# Patient Record
Sex: MALE | Race: WHITE | NOT HISPANIC OR LATINO | Employment: OTHER | URBAN - METROPOLITAN AREA
[De-identification: names, ages, dates, MRNs, and addresses within clinical notes are randomized per-mention and may not be internally consistent; named-entity substitution may affect disease eponyms.]

---

## 2024-01-01 ENCOUNTER — NURSING HOME VISIT (OUTPATIENT)
Dept: GERIATRICS | Facility: CLINIC | Age: 79
End: 2024-01-01
Payer: MEDICARE

## 2024-01-01 ENCOUNTER — DOCUMENTATION ONLY (OUTPATIENT)
Dept: GERIATRICS | Facility: CLINIC | Age: 79
End: 2024-01-01

## 2024-01-01 VITALS
SYSTOLIC BLOOD PRESSURE: 151 MMHG | HEART RATE: 74 BPM | WEIGHT: 213 LBS | TEMPERATURE: 97.3 F | RESPIRATION RATE: 18 BRPM | OXYGEN SATURATION: 98 % | DIASTOLIC BLOOD PRESSURE: 92 MMHG | BODY MASS INDEX: 28.23 KG/M2 | HEIGHT: 73 IN

## 2024-01-01 DIAGNOSIS — Z91.81 PERSONAL HISTORY OF FALL: ICD-10-CM

## 2024-01-01 DIAGNOSIS — Z86.73 HISTORY OF STROKE: ICD-10-CM

## 2024-01-01 DIAGNOSIS — E43 SEVERE MALNUTRITION (H): ICD-10-CM

## 2024-01-01 DIAGNOSIS — I50.23 ACUTE ON CHRONIC SYSTOLIC HEART FAILURE (H): ICD-10-CM

## 2024-01-01 DIAGNOSIS — N04.9 NEPHROSIS: ICD-10-CM

## 2024-01-01 DIAGNOSIS — Z51.5 HOSPICE CARE PATIENT: Primary | ICD-10-CM

## 2024-01-01 DIAGNOSIS — F03.C18 SEVERE DEMENTIA WITH OTHER BEHAVIORAL DISTURBANCE, UNSPECIFIED DEMENTIA TYPE (H): ICD-10-CM

## 2024-01-01 PROCEDURE — 99316 NF DSCHRG MGMT 30 MIN+: CPT | Mod: GV | Performed by: NURSE PRACTITIONER

## 2024-01-01 RX ORDER — POLYETHYLENE GLYCOL 3350 17 G/17G
1 POWDER, FOR SOLUTION ORAL EVERY OTHER DAY
COMMUNITY

## 2024-01-01 RX ORDER — LANOLIN ALCOHOL/MO/W.PET/CERES
3 CREAM (GRAM) TOPICAL
COMMUNITY

## 2024-01-01 RX ORDER — SENNOSIDES 8.6 MG
1 TABLET ORAL 2 TIMES DAILY PRN
COMMUNITY

## 2024-01-01 RX ORDER — BACLOFEN 10 MG/1
10 TABLET ORAL 3 TIMES DAILY
COMMUNITY

## 2024-01-01 RX ORDER — OLANZAPINE 5 MG/1
5 TABLET ORAL EVERY 4 HOURS PRN
COMMUNITY

## 2024-01-01 RX ORDER — TAMSULOSIN HYDROCHLORIDE 0.4 MG/1
0.4 CAPSULE ORAL DAILY
COMMUNITY

## 2024-01-01 RX ORDER — MORPHINE SULFATE 30 MG/1
5 TABLET ORAL
COMMUNITY

## 2024-01-01 RX ORDER — BISACODYL 10 MG
10 SUPPOSITORY, RECTAL RECTAL DAILY PRN
COMMUNITY

## 2024-01-01 RX ORDER — HYOSCYAMINE SULFATE 0.125 MG
0.12 TABLET ORAL
COMMUNITY

## 2024-01-01 RX ORDER — LORAZEPAM 0.5 MG/1
0.5 TABLET ORAL
COMMUNITY

## 2024-01-16 NOTE — LETTER
1/16/2024        RE: Yonathan Farrar  [address]  [facility] [address]        [facility] GERIATRICS    PRIMARY CARE PROVIDER AND CLINIC:  DEVEN Noonan CNP, [address] [facility] / [place] [address]  Chief Complaint   Patient presents with     [facility] Medical Record Number:  [id number]  Place of Service where encounter took place:  [facility] () [[id number]]    Yonathan Farrar  is a 78 year old  (1945), admitted to the above facility from  [facility] . Hospital stay 12/19/23 through 1/11/24..   HPI:    78 y.o. male who presented to the ER 12/29/23 with generalized weakness.  Patient is a difficult historian and not able to reach his family to get further history so somewhat limited. Patient has been feeling increasingly generally weak over the last week. No specific symptoms. Denies fever, chills, cough, abdominal pain, nausea, vomiting, diarrhea. He has felt so weak that he has fallen a few times. He remembers all of the falls and denies any injury. His sister has had to call EMS a few times to help him get back to his chair. He denies urinary symptoms but notes a few episodes of incontinence while he was in his chair, which is unusual for him. His mood has been down since his wife's death several months ago. Had a brief hospitalization for suicidal ideation in August. No suicidal thoughts or thoughts of self-harm since. He feels more motivated recently and is trying to get his father inducted into the [place] [employer] [facility]. He lives with his sister and feels like he has good family support. Found to have significant bilateral hydronephrosis, turner placed for decompression. The patient verbalized wanting to pursue comfort cares and hospice and no longer wanting aggressive treatment. He was admitted into LTC with hospice, however now plans to discharge home this week with family and hospice support.     The primary encounter diagnosis was Hospice care  patient. Diagnoses of Nephrosis, Personal history of fall, Acute on chronic systolic heart failure (H), History of stroke, Severe dementia with other behavioral disturbance, unspecified dementia type (H), and Severe malnutrition (H24) were also pertinent to this visit.    Met with patient who was found lying in bed. Mood pleasant and cooperative. Able to make needs known. He denies any chest pain, palpitations, shortness of breath, TOSCANO, lightheadedness, dizziness, or cough. Denies any abdominal discomfort. Denies N&V. Denies dysuria or frequency. Hernandez catheter present with clear yellow urine present. Denies loose or constipation. Oral intake remains poor. Sleeping well. Denies any pain complaints. He reports being happy that he is discharging home soon with sister and hospice support.     BP Readings from Last 3 Encounters:   01/16/24 (!) 151/92     Wt Readings from Last 5 Encounters:   01/16/24 96.6 kg (213 lb)     CODE STATUS/ADVANCE DIRECTIVES DISCUSSION:  No CPR- Do NOT Intubate  DNR / DNI  ALLERGIES: No Known Allergies   PAST MEDICAL HISTORY:   Past Medical History:   Diagnosis Date     Acute heart failure, unspecified heart failure type (H)      Acute renal failure, unspecified acute renal failure type (H24)      Acute systolic congestive heart failure (H)      Adjustment disorder with depressed mood      Chronic kidney disease, stage 3 unspecified (H)      Cystitis, unspecified without hematuria      Encounter for palliative care      Essential (primary) hypertension      Generalized muscle weakness      Gross hematuria      Hemiplegia and hemiparesis following unspecified cerebrovascular disease affecting unspecified side (H)      Hyperlipidemia, unspecified      Hyperparathyroidism, unspecified (H24)      NSTEMI (non-ST elevated myocardial infarction) (H)      Obstructive sleep apnea (adult) (pediatric)      Other fatigue      Other forms of acute ischemic heart disease      Other malaise      Other  retention of urine      Pain      Personal history of fall      Shortness of breath      Unspecified atrial fibrillation (H)      Unspecified dementia, unspecified severity, without behavioral disturbance, psychotic disturbance, mood disturbance, and anxiety (H)       PAST SURGICAL HISTORY:   has a past surgical history that includes IR Iliac Artery Angioplasty/ Stent (9/25/2015).  FAMILY HISTORY: family history is not on file.  SOCIAL HISTORY:     Patient's living condition:  Sister will move in to assist with cares. Continue hospice at home is goal    Post Discharge Medication Reconciliation Status:   MED REC REQUIRED  Post Medication Reconciliation Status:  Discharge medications reconciled and changed, see notes/orders       Current Outpatient Medications   Medication Sig     baclofen (LIORESAL) 10 MG tablet Take 10 mg by mouth 3 times daily     bisacodyl (DULCOLAX) 10 MG suppository Place 10 mg rectally daily as needed for constipation     hyoscyamine (LEVSIN) 0.125 MG tablet Take 0.125 mg by mouth every 2 hours as needed for cramping     LORazepam (ATIVAN) 0.5 MG tablet Take 0.5 mg by mouth every 2 hours as needed for anxiety     melatonin 3 MG tablet Take 3 mg by mouth nightly as needed for sleep     morphine 5 MG solu-tab Take 5 mg by mouth every hour as needed for shortness of breath or moderate to severe pain     OLANZapine (ZYPREXA) 5 MG tablet Take 5 mg by mouth every 4 hours as needed (delirium)     polyethylene glycol (MIRALAX) 17 g packet Take 1 packet by mouth every other day     sennosides (SENOKOT) 8.6 MG tablet Take 1 tablet by mouth 2 times daily as needed for constipation     tamsulosin (FLOMAX) 0.4 MG capsule Take 0.4 mg by mouth daily     No current facility-administered medications for this visit.       ROS:  4 point ROS including Respiratory, CV, GI and , other than that noted in the HPI,  is negative      Vitals:  BP (!) 151/92   Pulse 74   Temp 97.3  F (36.3  C)   Resp 18   Ht 1.854 m  "(6' 1\")   Wt 96.6 kg (213 lb)   SpO2 98%   BMI 28.10 kg/m    Exam:  GENERAL APPEARANCE:  Alert, in no distress, cooperative  ENT:  Mouth and posterior oropharynx normal, moist mucous membranes, [place]  EYES:  EOM, conjunctivae, lids, pupils and irises normal  NECK:  No adenopathy,masses or thyromegaly  RESP:  respiratory effort and palpation of chest normal, lungs clear to auscultation , no respiratory distress  CV:  Palpation and auscultation of heart done , regular rate and rhythm, no murmur, rub, or gallop, no edema, +2 pedal pulses  ABDOMEN:  normal bowel sounds, soft, nontender, no hepatosplenomegaly or other masses, no guarding or rebound  M/S:   Wheelchair bound. Staff to assist for all ADLs, transfers and cares  SKIN:  Inspection of skin and subcutaneous tissue baseline, Palpation of skin and subcutaneous tissue baseline  NEURO:   Cranial nerves 2-12 are normal tested and grossly at patient's baseline, no purposeful movement in upper and lower extremities  PSYCH:  oriented to self and place, insight and judgement impaired, memory impaired , affect and mood normal    Lab/Diagnostic data:  Patient is on hospice/palliative care and labs are not recommended    ASSESSMENT/PLAN:       Hospice care patient  Nephrosis  Personal history of fall  Acute on chronic systolic heart failure (H)  History of stroke  Severe dementia with other behavioral disturbance, unspecified dementia type (H)  Severe malnutrition (H24)  Comment: Acute on chronic. Patient opted for hospice treatment and does not want aggressive intervention. He was admitted into LTC, however sister plans to move in with him to provide care along with hospice support at home.   Plan:  -Continue hospice with [facility] as directed  -Reduce polypharmacy where able  -Monitor for worsening s/symptoms of concern  -Hernandez catheter as directed. Hospice to manage post discharge.     Electronically signed by:  Dr. Regla Donato DNP, APRN, FNP-C, WCS-C, EDS-C        M " [facility] GERIATRICS DISCHARGE SUMMARY  PATIENT'S NAME: Yonathan Farrar  YOB: 1945  MEDICAL RECORD NUMBER:  [id number]  Place of Service where encounter took place:  [facility] () [[id number]]    PRIMARY CARE PROVIDER AND CLINIC RESPONSIBLE AFTER TRANSFER:   Keke Diaz III, MD   [address] [place] [address]    Non-FMG Provider     Transferring providers: Regla Donato, DEVEN CNP, Meka Lovell MD  Recent Hospitalization/ED:  [facility]  stay 12/29/23 to 1/11/24.  Date of SNF Admission:  1/11/24  Date of SNF (anticipated) Discharge:  anticipated before 1/19/24 with hospice support  Discharged to: previous independent home with sister and hospice support  Cognitive Scores:  unable to assessed    Physical Function:  bed bound. Assistance for all ADLs, transfers and cares  DME: hospice to assist with DME needs for home care    CODE STATUS/ADVANCE DIRECTIVES DISCUSSION:  No CPR- Do NOT Intubate     NURSING FACILITY COURSE   Medication Changes/Rationale:   See above    Summary of nursing facility stay:   See above    Controlled medications:   Hospice to continue to order and send at home as directed       DISCHARGE PLAN:  Follow up labs: No labs orders/due  Medical Follow Up:      Follow up with ongoing hospice goals and needs  Current [place] scheduled appointments:  Discharge Services: Home Care:  Hospice with [facility] as directed  Discharge Instructions Verbalized to Patient at Discharge:   Weight bearing restrictions:  Weight bearing as tolerated.   Continue to follow your diet:  regular.   Continue turner catheter needs. Change PRN  24-hour supervision is recommended for safety.     TOTAL DISCHARGE TIME:   Greater than 30 minutes  Electronically signed by:  Dr. Regla Donato DNP, APRN, FNP-C, WCS-C, EDS-C    Documentation of Face to Face and Certification for Home Health Services    I certify that patient: Yonathan Farrar is under my care and that I, or a nurse  practitioner or physician's assistant working with me, had a face-to-face encounter that meets the physician face-to-face encounter requirements with this patient on: 1/16/2024.    This encounter with the patient was in whole, or in part, for the following medical condition, which is the primary reason for home health care: The primary encounter diagnosis was Hospice care patient. Diagnoses of Nephrosis, Personal history of fall, Acute on chronic systolic heart failure (H), History of stroke, Severe dementia with other behavioral disturbance, unspecified dementia type (H), and Severe malnutrition (H24) were also pertinent to this visit..    I certify that, based on my findings, the following services are medically necessary home health services:  Hospice with [facility] .    My clinical findings support the need for the above services because:  Hospice for ongoing decline    Further, I certify that my clinical findings support that this patient is homebound (i.e. absences from home require considerable and taxing effort and are for medical reasons or Christian services or infrequently or of short duration when for other reasons) because:  Hospice for all ongoing needs and support .    Based on the above findings. I certify that this patient is confined to the home and needs intermittent skilled nursing care, physical therapy and/or speech therapy.  The patient is under my care, and I have initiated the establishment of the plan of care.  This patient will be followed by a physician who will periodically review the plan of care.  Physician/Provider to provide follow up care: Keke Diaz III, MD     Attending hospital physician (the Medicare certified [place] provider): Dr.Sara Nas MD, signing F2F and only signing for initial order. Please send all follow up questions and concerns or needed follow up signatures to the PCP, who [place] has on file as: Keke Diaz III, MD     Physician Signature: See  electronic signature associated with these discharge orders.  Date: 1/15/2024                    Sincerely,        DEVEN Noonan CNP

## 2024-01-16 NOTE — LETTER
1/16/2024        RE: Yonathan Farrar  [address]  [facility] [address]        [facility] GERIATRICS    PRIMARY CARE PROVIDER AND CLINIC:  DEVEN Noonan CNP, [address] [facility] / [place] [address]  Chief Complaint   Patient presents with     [facility] Medical Record Number:  [id number]  Place of Service where encounter took place:  [facility] () [[id number]]    Yonathan Farrar  is a 78 year old  (1945), admitted to the above facility from  [facility] . Hospital stay 12/19/23 through 1/11/24..   HPI:    78 y.o. male who presented to the ER 12/29/23 with generalized weakness.  Patient is a difficult historian and not able to reach his family to get further history so somewhat limited. Patient has been feeling increasingly generally weak over the last week. No specific symptoms. Denies fever, chills, cough, abdominal pain, nausea, vomiting, diarrhea. He has felt so weak that he has fallen a few times. He remembers all of the falls and denies any injury. His sister has had to call EMS a few times to help him get back to his chair. He denies urinary symptoms but notes a few episodes of incontinence while he was in his chair, which is unusual for him. His mood has been down since his wife's death several months ago. Had a brief hospitalization for suicidal ideation in August. No suicidal thoughts or thoughts of self-harm since. He feels more motivated recently and is trying to get his father inducted into the [place] [employer] of [facility]. He lives with his sister and feels like he has good family support. Found to have significant bilateral hydronephrosis, turner placed for decompression. The patient verbalized wanting to pursue comfort cares and hospice and no longer wanting aggressive treatment. He was admitted into LTC with hospice, however now plans to discharge home this week with family and hospice support.     The primary encounter diagnosis was Hospice care  patient. Diagnoses of Nephrosis, Personal history of fall, Acute on chronic systolic heart failure (H), History of stroke, Severe dementia with other behavioral disturbance, unspecified dementia type (H), and Severe malnutrition (H24) were also pertinent to this visit.    Met with patient who was found lying in bed. Mood pleasant and cooperative. Able to make needs known. He denies any chest pain, palpitations, shortness of breath, TOSCANO, lightheadedness, dizziness, or cough. Denies any abdominal discomfort. Denies N&V. Denies dysuria or frequency. Hernandez catheter present with clear yellow urine present. Denies loose or constipation. Oral intake remains poor. Sleeping well. Denies any pain complaints. He reports being happy that he is discharging home soon with sister and hospice support.     BP Readings from Last 3 Encounters:   01/16/24 (!) 151/92     Wt Readings from Last 5 Encounters:   01/16/24 96.6 kg (213 lb)     CODE STATUS/ADVANCE DIRECTIVES DISCUSSION:  No CPR- Do NOT Intubate  DNR / DNI  ALLERGIES: No Known Allergies   PAST MEDICAL HISTORY:   Past Medical History:   Diagnosis Date     Acute heart failure, unspecified heart failure type (H)      Acute renal failure, unspecified acute renal failure type (H24)      Acute systolic congestive heart failure (H)      Adjustment disorder with depressed mood      Chronic kidney disease, stage 3 unspecified (H)      Cystitis, unspecified without hematuria      Encounter for palliative care      Essential (primary) hypertension      Generalized muscle weakness      Gross hematuria      Hemiplegia and hemiparesis following unspecified cerebrovascular disease affecting unspecified side (H)      Hyperlipidemia, unspecified      Hyperparathyroidism, unspecified (H24)      NSTEMI (non-ST elevated myocardial infarction) (H)      Obstructive sleep apnea (adult) (pediatric)      Other fatigue      Other forms of acute ischemic heart disease      Other malaise      Other  retention of urine      Pain      Personal history of fall      Shortness of breath      Unspecified atrial fibrillation (H)      Unspecified dementia, unspecified severity, without behavioral disturbance, psychotic disturbance, mood disturbance, and anxiety (H)       PAST SURGICAL HISTORY:   has a past surgical history that includes IR Iliac Artery Angioplasty/ Stent (9/25/2015).  FAMILY HISTORY: family history is not on file.  SOCIAL HISTORY:     Patient's living condition:  Sister will move in to assist with cares. Continue hospice at home is goal    Post Discharge Medication Reconciliation Status:   MED REC REQUIRED  Post Medication Reconciliation Status:  Discharge medications reconciled and changed, see notes/orders       Current Outpatient Medications   Medication Sig     baclofen (LIORESAL) 10 MG tablet Take 10 mg by mouth 3 times daily     bisacodyl (DULCOLAX) 10 MG suppository Place 10 mg rectally daily as needed for constipation     hyoscyamine (LEVSIN) 0.125 MG tablet Take 0.125 mg by mouth every 2 hours as needed for cramping     LORazepam (ATIVAN) 0.5 MG tablet Take 0.5 mg by mouth every 2 hours as needed for anxiety     melatonin 3 MG tablet Take 3 mg by mouth nightly as needed for sleep     morphine 5 MG solu-tab Take 5 mg by mouth every hour as needed for shortness of breath or moderate to severe pain     OLANZapine (ZYPREXA) 5 MG tablet Take 5 mg by mouth every 4 hours as needed (delirium)     polyethylene glycol (MIRALAX) 17 g packet Take 1 packet by mouth every other day     sennosides (SENOKOT) 8.6 MG tablet Take 1 tablet by mouth 2 times daily as needed for constipation     tamsulosin (FLOMAX) 0.4 MG capsule Take 0.4 mg by mouth daily     No current facility-administered medications for this visit.       ROS:  4 point ROS including Respiratory, CV, GI and , other than that noted in the HPI,  is negative      Vitals:  BP (!) 151/92   Pulse 74   Temp 97.3  F (36.3  C)   Resp 18   Ht 1.854 m  "(6' 1\")   Wt 96.6 kg (213 lb)   SpO2 98%   BMI 28.10 kg/m    Exam:  GENERAL APPEARANCE:  Alert, in no distress, cooperative  ENT:  Mouth and posterior oropharynx normal, moist mucous membranes, [place]  EYES:  EOM, conjunctivae, lids, pupils and irises normal  NECK:  No adenopathy,masses or thyromegaly  RESP:  respiratory effort and palpation of chest normal, lungs clear to auscultation , no respiratory distress  CV:  Palpation and auscultation of heart done , regular rate and rhythm, no murmur, rub, or gallop, no edema, +2 pedal pulses  ABDOMEN:  normal bowel sounds, soft, nontender, no hepatosplenomegaly or other masses, no guarding or rebound  M/S:   Wheelchair bound. Staff to assist for all ADLs, transfers and cares  SKIN:  Inspection of skin and subcutaneous tissue baseline, Palpation of skin and subcutaneous tissue baseline  NEURO:   Cranial nerves 2-12 are normal tested and grossly at patient's baseline, no purposeful movement in upper and lower extremities  PSYCH:  oriented to self and place, insight and judgement impaired, memory impaired , affect and mood normal    Lab/Diagnostic data:  Patient is on hospice/palliative care and labs are not recommended    ASSESSMENT/PLAN:       Hospice care patient  Nephrosis  Personal history of fall  Acute on chronic systolic heart failure (H)  History of stroke  Severe dementia with other behavioral disturbance, unspecified dementia type (H)  Severe malnutrition (H24)  Comment: Acute on chronic. Patient opted for hospice treatment and does not want aggressive intervention. He was admitted into LTC, however sister plans to move in with him to provide care along with hospice support at home.   Plan:  -Continue hosp[facility] with [facility] as directed  -Reduce polypharmacy where able  -Monitor for worsening s/symptoms of concern  -Hernandez catheter as directed. Hospice to manage post discharge.     Electronically signed by:  Dr. Regla Donato DNP, APRN, FNP-C, WCS-C, EDS-C        M " [facility] GERIATRICS DISCHARGE SUMMARY  PATIENT'S NAME: Yonathan Farrar  YOB: 1945  MEDICAL RECORD NUMBER:  [id number]  Place of Service where encounter took place:  [facility] () [[id number]]    PRIMARY CARE PROVIDER AND CLINIC RESPONSIBLE AFTER TRANSFER:   Keke Diaz III, MD   [address] [place] [address]    Non-FMG Provider     Transferring providers: Regla Donato, DEVEN CNP, Meka Lovell MD  Recent Hospitalization/ED:  [facility]  stay 12/29/23 to 1/11/24.  Date of SNF Admission:  1/11/24  Date of SNF (anticipated) Discharge:  anticipated before 1/19/24 with hospice support  Discharged to: previous independent home with sister and hospice support  Cognitive Scores:  unable to assessed    Physical Function:  bed bound. Assistance for all ADLs, transfers and cares  DME: hospice to assist with DME needs for home care    CODE STATUS/ADVANCE DIRECTIVES DISCUSSION:  No CPR- Do NOT Intubate     NURSING FACILITY COURSE   Medication Changes/Rationale:   See above    Summary of nursing facility stay:   See above    Controlled medications:   Hospice to continue to order and send at home as directed       DISCHARGE PLAN:  Follow up labs: No labs orders/due  Medical Follow Up:      Follow up with ongoing hospice goals and needs  Current [place] scheduled appointments:  Discharge Services: Home Care:  Hospice with [facility] as directed  Discharge Instructions Verbalized to Patient at Discharge:   Weight bearing restrictions:  Weight bearing as tolerated.   Continue to follow your diet:  regular.   Continue turner catheter needs. Change PRN  24-hour supervision is recommended for safety.     TOTAL DISCHARGE TIME:   Greater than 30 minutes  Electronically signed by:  Dr. Regla Donato DNP, APRN, FNP-C, WCS-C, EDS-C    Documentation of Face to Face and Certification for Home Health Services    I certify that patient: Yonathan Farrar is under my care and that I, or a nurse  practitioner or physician's assistant working with me, had a face-to-face encounter that meets the physician face-to-face encounter requirements with this patient on: 1/16/2024.    This encounter with the patient was in whole, or in part, for the following medical condition, which is the primary reason for home health care: The primary encounter diagnosis was Hospice care patient. Diagnoses of Nephrosis, Personal history of fall, Acute on chronic systolic heart failure (H), History of stroke, Severe dementia with other behavioral disturbance, unspecified dementia type (H), and Severe malnutrition (H24) were also pertinent to this visit..    I certify that, based on my findings, the following services are medically necessary home health services:  Hospice with [facility] .    My clinical findings support the need for the above services because:  Hospice for ongoing decline    Further, I certify that my clinical findings support that this patient is homebound (i.e. absences from home require considerable and taxing effort and are for medical reasons or [employer]hov[employer]'s witness services or infrequently or of short duration when for other reasons) because:  Hospice for all ongoing needs and support .    Based on the above findings. I certify that this patient is confined to the home and needs intermittent skilled nursing care, physical therapy and/or speech therapy.  The patient is under my care, and I have initiated the establishment of the plan of care.  This patient will be followed by a physician who will periodically review the plan of care.  Physician/Provider to provide follow up care: Keke Diaz III, MD     Attending hospital physician (the Medicare certified [place] provider): Dr.Sara Nas MD, signing F2F and only signing for initial order. Please send all follow up questions and concerns or needed follow up signatures to the PCP, who [place] has on file as: Kkee Diaz III, MD     Physician Signature: See  electronic signature associated with these discharge orders.  Date: 1/15/2024                    Sincerely,        DEVEN Noonan CNP

## 2024-01-16 NOTE — LETTER
1/16/2024        RE: Yonathan Farrar  [address]  [facility] [address]        [facility] GERIATRICS    PRIMARY CARE PROVIDER AND CLINIC:  DEVEN Noonan CNP, [address] [facility] / [place] [address]  Chief Complaint   Patient presents with     [facility] Medical Record Number:  [id number]  Place of Service where encounter took place:  [facility] () [[id number]]    Yonathan Farrar  is a 78 year old  (1945), admitted to the above facility from  [facility] . Hospital stay 12/19/23 through 1/11/24..   HPI:    78 y.o. male who presented to the ER 12/29/23 with generalized weakness.  Patient is a difficult historian and not able to reach his family to get further history so somewhat limited. Patient has been feeling increasingly generally weak over the last week. No specific symptoms. Denies fever, chills, cough, abdominal pain, nausea, vomiting, diarrhea. He has felt so weak that he has fallen a few times. He remembers all of the falls and denies any injury. His sister has had to call EMS a few times to help him get back to his chair. He denies urinary symptoms but notes a few episodes of incontinence while he was in his chair, which is unusual for him. His mood has been down since his wife's death several months ago. Had a brief hospitalization for suicidal ideation in August. No suicidal thoughts or thoughts of self-harm since. He feels more motivated recently and is trying to get his father inducted into the [place] [employer] [facility]. He lives with his sister and feels like he has good family support. Found to have significant bilateral hydronephrosis, turner placed for decompression. The patient verbalized wanting to pursue comfort cares and hospice and no longer wanting aggressive treatment. He was admitted into LTC with hospice, however now plans to discharge home this week with family and hospice support.     The primary encounter diagnosis was Hospice care  patient. Diagnoses of Nephrosis, Personal history of fall, Acute on chronic systolic heart failure (H), History of stroke, Severe dementia with other behavioral disturbance, unspecified dementia type (H), and Severe malnutrition (H24) were also pertinent to this visit.    Met with patient who was found lying in bed. Mood pleasant and cooperative. Able to make needs known. He denies any chest pain, palpitations, shortness of breath, TOSCANO, lightheadedness, dizziness, or cough. Denies any abdominal discomfort. Denies N&V. Denies dysuria or frequency. Hernandez catheter present with clear yellow urine present. Denies loose or constipation. Oral intake remains poor. Sleeping well. Denies any pain complaints. He reports being happy that he is discharging home soon with sister and hospice support.     BP Readings from Last 3 Encounters:   01/16/24 (!) 151/92     Wt Readings from Last 5 Encounters:   01/16/24 96.6 kg (213 lb)     CODE STATUS/ADVANCE DIRECTIVES DISCUSSION:  No CPR- Do NOT Intubate  DNR / DNI  ALLERGIES: No Known Allergies   PAST MEDICAL HISTORY:   Past Medical History:   Diagnosis Date     Acute heart failure, unspecified heart failure type (H)      Acute renal failure, unspecified acute renal failure type (H24)      Acute systolic congestive heart failure (H)      Adjustment disorder with depressed mood      Chronic kidney disease, stage 3 unspecified (H)      Cystitis, unspecified without hematuria      Encounter for palliative care      Essential (primary) hypertension      Generalized muscle weakness      Gross hematuria      Hemiplegia and hemiparesis following unspecified cerebrovascular disease affecting unspecified side (H)      Hyperlipidemia, unspecified      Hyperparathyroidism, unspecified (H24)      NSTEMI (non-ST elevated myocardial infarction) (H)      Obstructive sleep apnea (adult) (pediatric)      Other fatigue      Other forms of acute ischemic heart disease      Other malaise      Other  retention of urine      Pain      Personal history of fall      Shortness of breath      Unspecified atrial fibrillation (H)      Unspecified dementia, unspecified severity, without behavioral disturbance, psychotic disturbance, mood disturbance, and anxiety (H)       PAST SURGICAL HISTORY:   has a past surgical history that includes IR Iliac Artery Angioplasty/ Stent (9/25/2015).  FAMILY HISTORY: family history is not on file.  SOCIAL HISTORY:     Patient's living condition:  Sister will move in to assist with cares. Continue hospice at home is goal    Post Discharge Medication Reconciliation Status:   MED REC REQUIRED  Post Medication Reconciliation Status:  Discharge medications reconciled and changed, see notes/orders       Current Outpatient Medications   Medication Sig     baclofen (LIORESAL) 10 MG tablet Take 10 mg by mouth 3 times daily     bisacodyl (DULCOLAX) 10 MG suppository Place 10 mg rectally daily as needed for constipation     hyoscyamine (LEVSIN) 0.125 MG tablet Take 0.125 mg by mouth every 2 hours as needed for cramping     LORazepam (ATIVAN) 0.5 MG tablet Take 0.5 mg by mouth every 2 hours as needed for anxiety     melatonin 3 MG tablet Take 3 mg by mouth nightly as needed for sleep     morphine 5 MG solu-tab Take 5 mg by mouth every hour as needed for shortness of breath or moderate to severe pain     OLANZapine (ZYPREXA) 5 MG tablet Take 5 mg by mouth every 4 hours as needed (delirium)     polyethylene glycol (MIRALAX) 17 g packet Take 1 packet by mouth every other day     sennosides (SENOKOT) 8.6 MG tablet Take 1 tablet by mouth 2 times daily as needed for constipation     tamsulosin (FLOMAX) 0.4 MG capsule Take 0.4 mg by mouth daily     No current facility-administered medications for this visit.       ROS:  4 point ROS including Respiratory, CV, GI and , other than that noted in the HPI,  is negative      Vitals:  BP (!) 151/92   Pulse 74   Temp 97.3  F (36.3  C)   Resp 18   Ht 1.854 m  "(6' 1\")   Wt 96.6 kg (213 lb)   SpO2 98%   BMI 28.10 kg/m    Exam:  GENERAL APPEARANCE:  Alert, in no distress, cooperative  ENT:  Mouth and posterior oropharynx normal, moist mucous membranes, [place]  EYES:  EOM, conjunctivae, lids, pupils and irises normal  NECK:  No adenopathy,masses or thyromegaly  RESP:  respiratory effort and palpation of chest normal, lungs clear to auscultation , no respiratory distress  CV:  Palpation and auscultation of heart done , regular rate and rhythm, no murmur, rub, or gallop, no edema, +2 pedal pulses  ABDOMEN:  normal bowel sounds, soft, nontender, no hepatosplenomegaly or other masses, no guarding or rebound  M/S:   Wheelchair bound. Staff to assist for all ADLs, transfers and cares  SKIN:  Inspection of skin and subcutaneous tissue baseline, Palpation of skin and subcutaneous tissue baseline  NEURO:   Cranial nerves 2-12 are normal tested and grossly at patient's baseline, no purposeful movement in upper and lower extremities  PSYCH:  oriented to self and place, insight and judgement impaired, memory impaired , affect and mood normal    Lab/Diagnostic data:  Patient is on hospice/palliative care and labs are not recommended    ASSESSMENT/PLAN:       Hospice care patient  Nephrosis  Personal history of fall  Acute on chronic systolic heart failure (H)  History of stroke  Severe dementia with other behavioral disturbance, unspecified dementia type (H)  Severe malnutrition (H24)  Comment: Acute on chronic. Patient opted for hospice treatment and does not want aggressive intervention. He was admitted into LTC, however sister plans to move in with him to provide care along with hospice support at home.   Plan:  -Continue hospice with [facility] as directed  -Reduce polypharmacy where able  -Monitor for worsening s/symptoms of concern  -Hernandez catheter as directed. Hospice to manage post discharge.     Electronically signed by:  Dr. eRgla Donato DNP, APRN, FNP-C, WCS-C, EDS-C        [facility] GERIATRICS DISCHARGE SUMMARY  PATIENT'S NAME: Yonathan Farrar  YOB: 1945  MEDICAL RECORD NUMBER:  [id number]  Place of Service where encounter took place:  [facility] () [[id number]]    PRIMARY CARE PROVIDER AND CLINIC RESPONSIBLE AFTER TRANSFER:   Keke Diaz III, MD   [address] [place] [address]    Non-[facility] Provider     Transferring providers: Regla Donato, DEVEN CNP, Meka Lovell MD  Recent Hospitalization/ED:  [facility]  stay 12/29/23 to 1/11/24.  Date of SNF Admission:  1/11/24  Date of SNF (anticipated) Discharge:  anticipated before 1/19/24 with hospice support  Discharged to: previous independent home with sister and hospice support  Cognitive Scores:  unable to assessed    Physical Function:  bed bound. Assistance for all ADLs, transfers and cares  DME: hospice to assist with DME needs for home care    CODE STATUS/ADVANCE DIRECTIVES DISCUSSION:  No CPR- Do NOT Intubate     NURSING FACILITY COURSE   Medication Changes/Rationale:   See above    Summary of nursing facility stay:   See above    Controlled medications:   Hospice to continue to order and send at home as directed       DISCHARGE PLAN:  Follow up labs: No labs orders/due  Medical Follow Up:      Follow up with ongoing hospice goals and needs  Current [place] scheduled appointments:  Discharge Services: Home Care:  Hospice with [facility] as directed  Discharge Instructions Verbalized to Patient at Discharge:   Weight bearing restrictions:  Weight bearing as tolerated.   Continue to follow your diet:  regular.   Continue turner catheter needs. Change PRN  24-hour supervision is recommended for safety.     TOTAL DISCHARGE TIME:   Greater than 30 minutes  Electronically signed by:  Dr. Regla Donato DNP, APRN, FNP-C, WCS-C, EDS-C    Documentation of Face to Face and Certification for Home Health Services    I certify that patient: Yonathan Farrar is under my care and that I, or a nurse  practitioner or physician's assistant working with me, had a face-to-face encounter that meets the physician face-to-face encounter requirements with this patient on: 1/16/2024.    This encounter with the patient was in whole, or in part, for the following medical condition, which is the primary reason for home health care: The primary encounter diagnosis was Hospice care patient. Diagnoses of Nephrosis, Personal history of fall, Acute on chronic systolic heart failure (H), History of stroke, Severe dementia with other behavioral disturbance, unspecified dementia type (H), and Severe malnutrition (H24) were also pertinent to this visit..    I certify that, based on my findings, the following services are medically necessary home health services:  Hospice with [facility] .    My clinical findings support the need for the above services because:  Hospice for ongoing decline    Further, I certify that my clinical findings support that this patient is homebound (i.e. absences from home require considerable and taxing effort and are for medical reasons or [employer] services or infrequently or of short duration when for other reasons) because:  Hospice for all ongoing needs and support .    Based on the above findings. I certify that this patient is confined to the home and needs intermittent skilled nursing care, physical therapy and/or speech therapy.  The patient is under my care, and I have initiated the establishment of the plan of care.  This patient will be followed by a physician who will periodically review the plan of care.  Physician/Provider to provide follow up care: Keke Diaz III, MD     Attending hospital physician (the Medicare certified [place] provider): Dr.Sara Nas MD, signing F2F and only signing for initial order. Please send all follow up questions and concerns or needed follow up signatures to the PCP, who [facility] has on file as: Keke Diaz III, MD     Physician Signature: See  electronic signature associated with these discharge orders.  Date: 1/15/2024                    Sincerely,        DEVEN Noonan CNP

## 2024-01-16 NOTE — PROGRESS NOTES
[facility]ERIATRICS    PRIMARY CARE PROVIDER AND CLINIC:  DEVEN Noonan CNP, [address] / [place] [address]  Chief Complaint   Patient presents with    [facility] Medical Record Number:  [id number]  Place of Service where encounter took place:  [facility] () [[id number]]    Yonathan Farrar  is a 78 year old  (1945), admitted to the above facility from  [facility] . Hospital stay 12/19/23 through 1/11/24..   HPI:    78 y.o. male who presented to the ER 12/29/23 with generalized weakness.  Patient is a difficult historian and not able to reach his family to get further history so somewhat limited. Patient has been feeling increasingly generally weak over the last week. No specific symptoms. Denies fever, chills, cough, abdominal pain, nausea, vomiting, diarrhea. He has felt so weak that he has fallen a few times. He remembers all of the falls and denies any injury. His sister has had to call EMS a few times to help him get back to his chair. He denies urinary symptoms but notes a few episodes of incontinence while he was in his chair, which is unusual for him. His mood has been down since his wife's death several months ago. Had a brief hospitalization for suicidal ideation in August. No suicidal thoughts or thoughts of self-harm since. He feels more motivated recently and is trying to get his father inducted into the [place] [employer]. He lives with his sister and feels like he has good family support. Found to have significant bilateral hydronephrosis, turner placed for decompression. The patient verbalized wanting to pursue comfort cares and hospice and no longer wanting aggressive treatment. He was admitted into LTC with hospice, however now plans to discharge home this week with family and hospice support.     The primary encounter diagnosis was Hospice care patient. Diagnoses of Nephrosis, Personal history of fall, Acute on chronic systolic  heart failure (H), History of stroke, Severe dementia with other behavioral disturbance, unspecified dementia type (H), and Severe malnutrition (H24) were also pertinent to this visit.    Met with patient who was found lying in bed. Mood pleasant and cooperative. Able to make needs known. He denies any chest pain, palpitations, shortness of breath, TOSCANO, lightheadedness, dizziness, or cough. Denies any abdominal discomfort. Denies N&V. Denies dysuria or frequency. Hernandez catheter present with clear yellow urine present. Denies loose or constipation. Oral intake remains poor. Sleeping well. Denies any pain complaints. He reports being happy that he is discharging home soon with sister and hospice support.     BP Readings from Last 3 Encounters:   01/16/24 (!) 151/92     Wt Readings from Last 5 Encounters:   01/16/24 96.6 kg (213 lb)     CODE STATUS/ADVANCE DIRECTIVES DISCUSSION:  No CPR- Do NOT Intubate  DNR / DNI  ALLERGIES: No Known Allergies   PAST MEDICAL HISTORY:   Past Medical History:   Diagnosis Date    Acute heart failure, unspecified heart failure type (H)     Acute renal failure, unspecified acute renal failure type (H24)     Acute systolic congestive heart failure (H)     Adjustment disorder with depressed mood     Chronic kidney disease, stage 3 unspecified (H)     Cystitis, unspecified without hematuria     Encounter for palliative care     Essential (primary) hypertension     Generalized muscle weakness     Gross hematuria     Hemiplegia and hemiparesis following unspecified cerebrovascular disease affecting unspecified side (H)     Hyperlipidemia, unspecified     Hyperparathyroidism, unspecified (H24)     NSTEMI (non-ST elevated myocardial infarction) (H)     Obstructive sleep apnea (adult) (pediatric)     Other fatigue     Other forms of acute ischemic heart disease     Other malaise     Other retention of urine     Pain     Personal history of fall     Shortness of breath     Unspecified atrial  "fibrillation (H)     Unspecified dementia, unspecified severity, without behavioral disturbance, psychotic disturbance, mood disturbance, and anxiety (H)       PAST SURGICAL HISTORY:   has a past surgical history that includes IR Iliac Artery Angioplasty/ Stent (9/25/2015).  FAMILY HISTORY: family history is not on file.  SOCIAL HISTORY:     Patient's living condition:  Sister will move in to assist with cares. Continue hospice at home is goal    Post Discharge Medication Reconciliation Status:   MED REC REQUIRED  Post Medication Reconciliation Status:  Discharge medications reconciled and changed, see notes/orders       Current Outpatient Medications   Medication Sig    baclofen (LIORESAL) 10 MG tablet Take 10 mg by mouth 3 times daily    bisacodyl (DULCOLAX) 10 MG suppository Place 10 mg rectally daily as needed for constipation    hyoscyamine (LEVSIN) 0.125 MG tablet Take 0.125 mg by mouth every 2 hours as needed for cramping    LORazepam (ATIVAN) 0.5 MG tablet Take 0.5 mg by mouth every 2 hours as needed for anxiety    melatonin 3 MG tablet Take 3 mg by mouth nightly as needed for sleep    morphine 5 MG solu-tab Take 5 mg by mouth every hour as needed for shortness of breath or moderate to severe pain    OLANZapine (ZYPREXA) 5 MG tablet Take 5 mg by mouth every 4 hours as needed (delirium)    polyethylene glycol (MIRALAX) 17 g packet Take 1 packet by mouth every other day    sennosides (SENOKOT) 8.6 MG tablet Take 1 tablet by mouth 2 times daily as needed for constipation    tamsulosin (FLOMAX) 0.4 MG capsule Take 0.4 mg by mouth daily     No current facility-administered medications for this visit.       ROS:  4 point ROS including Respiratory, CV, GI and , other than that noted in the HPI,  is negative      Vitals:  BP (!) 151/92   Pulse 74   Temp 97.3  F (36.3  C)   Resp 18   Ht 1.854 m (6' 1\")   Wt 96.6 kg (213 lb)   SpO2 98%   BMI 28.10 kg/m    Exam:  GENERAL APPEARANCE:  Alert, in no distress, " cooperative  ENT:  Mouth and posterior oropharynx normal, moist mucous membranes, [place]  EYES:  EOM, conjunctivae, lids, pupils and irises normal  NECK:  No adenopathy,masses or thyromegaly  RESP:  respiratory effort and palpation of chest normal, lungs clear to auscultation , no respiratory distress  CV:  Palpation and auscultation of heart done , regular rate and rhythm, no murmur, rub, or gallop, no edema, +2 pedal pulses  ABDOMEN:  normal bowel sounds, soft, nontender, no hepatosplenomegaly or other masses, no guarding or rebound  M/S:   Wheelchair bound. Staff to assist for all ADLs, transfers and cares  SKIN:  Inspection of skin and subcutaneous tissue baseline, Palpation of skin and subcutaneous tissue baseline  NEURO:   Cranial nerves 2-12 are normal tested and grossly at patient's baseline, no purposeful movement in upper and lower extremities  PSYCH:  oriented to self and place, insight and judgement impaired, memory impaired , affect and mood normal    Lab/Diagnostic data:  Patient is on hospice/palliative care and labs are not recommended    ASSESSMENT/PLAN:       Hospice care patient  Nephrosis  Personal history of fall  Acute on chronic systolic heart failure (H)  History of stroke  Severe dementia with other behavioral disturbance, unspecified dementia type (H)  Severe malnutrition (H24)  Comment: Acute on chronic. Patient opted for hospice treatment and does not want aggressive intervention. He was admitted into LTC, however sister plans to move in with him to provide care along with hospice support at home.   Plan:  -Continue hosp[facility] with [facility] as directed  -Reduce polypharmacy where able  -Monitor for worsening s/symptoms of concern  -Hernandez catheter as directed. Hospice to manage post discharge.     Electronically signed by:  Dr. Regla Donato DNP, APRN, FNP-C, WCS-C, EDS-C        [facility] GERIATRICS DISCHARGE SUMMARY  PATIENT'S NAME: Yonathan Farrar  YOB: 1945  MEDICAL  RECORD NUMBER:  [id number]  Place of Service where encounter took place:  [facility] () [[id number]]    PRIMARY CARE PROVIDER AND CLINIC RESPONSIBLE AFTER TRANSFER:   Keke Diaz III, MD   [address] [place] [address]    Non-[facility] Provider     Transferring providers: DEVEN Noonan CNP, Meka Lovell MD  Recent Hospitalization/ED:  Hospital  [facility]  stay 12/29/23 to 1/11/24.  Date of SNF Admission:  1/11/24  Date of SNF (anticipated) Discharge:  anticipated before 1/19/24 with hospice support  Discharged to: previous independent home with sister and hospice support  Cognitive Scores:  unable to assessed    Physical Function:  bed bound. Assistance for all ADLs, transfers and cares  DME: hospice to assist with DME needs for home care    CODE STATUS/ADVANCE DIRECTIVES DISCUSSION:  No CPR- Do NOT Intubate     NURSING FACILITY COURSE   Medication Changes/Rationale:   See above    Summary of nursing facility stay:   See above    Controlled medications:   Hospice to continue to order and send at home as directed       DISCHARGE PLAN:  Follow up labs: No labs orders/due  Medical Follow Up:      Follow up with ongoing hospice goals and needs  [facility] scheduled appointments:  Discharge Services: Home Care:  Hospice with [facility] as directed  Discharge Instructions Verbalized to Patient at Discharge:   Weight bearing restrictions:  Weight bearing as tolerated.   Continue to follow your diet:  regular.   Continue turner catheter needs. Change PRN  24-hour supervision is recommended for safety.     TOTAL DISCHARGE TIME:   Greater than 30 minutes  Electronically signed by:  Dr. Regla Donato DNP, APRN, FNP-C, WCS-C, EDS-C    Documentation of Face to Face and Certification for Home Health Services    I certify that patient: Yonathan Farrar is under my care and that I, or a nurse practitioner or physician's assistant working with me, had a face-to-face encounter that meets the physician  face-to-face encounter requirements with this patient on: 1/16/2024.    This encounter with the patient was in whole, or in part, for the following medical condition, which is the primary reason for home health care: The primary encounter diagnosis was Hospice care patient. Diagnoses of Nephrosis, Personal history of fall, Acute on chronic systolic heart failure (H), History of stroke, Severe dementia with other behavioral disturbance, unspecified dementia type (H), and Severe malnutrition (H24) were also pertinent to this visit..    I certify that, based on my findings, the following services are medically necessary home health services:  Hospice with [facility] .    My clinical findings support the need for the above services because:  Hospice for ongoing decline    Further, I certify that my clinical findings support that this patient is homebound (i.e. absences from home require considerable and taxing effort and are for medical reasons or [employer] services or infrequently or of short duration when for other reasons) because:  Hospice for all ongoing needs and support .    Based on the above findings. I certify that this patient is confined to the home and needs intermittent skilled nursing care, physical therapy and/or speech therapy.  The patient is under my care, and I have initiated the establishment of the plan of care.  This patient will be followed by a physician who will periodically review the plan of care.  Physician/Provider to provide follow up care: Keke Diaz III, MD     Attending hospital physician (the Medicare certified [place] provider): Dr.Sara Nas MD, signing F2F and only signing for initial order. Please send all follow up questions and concerns or needed follow up signatures to the PCP, who [place] has on file as: Keke Diaz III, MD     Physician Signature: See electronic signature associated with these discharge orders.  Date: 1/15/2024